# Patient Record
Sex: MALE | Race: WHITE | NOT HISPANIC OR LATINO | Employment: FULL TIME | URBAN - METROPOLITAN AREA
[De-identification: names, ages, dates, MRNs, and addresses within clinical notes are randomized per-mention and may not be internally consistent; named-entity substitution may affect disease eponyms.]

---

## 2017-09-01 ENCOUNTER — OFFICE VISIT (OUTPATIENT)
Dept: PODIATRY | Facility: CLINIC | Age: 37
End: 2017-09-01
Payer: COMMERCIAL

## 2017-09-01 VITALS
WEIGHT: 210 LBS | SYSTOLIC BLOOD PRESSURE: 156 MMHG | DIASTOLIC BLOOD PRESSURE: 92 MMHG | BODY MASS INDEX: 28.44 KG/M2 | HEIGHT: 72 IN

## 2017-09-01 DIAGNOSIS — L85.3 XEROSIS CUTIS: Primary | ICD-10-CM

## 2017-09-01 DIAGNOSIS — R23.4 FISSURE IN SKIN OF FOOT: ICD-10-CM

## 2017-09-01 DIAGNOSIS — L30.9 DERMATITIS OF FOOT: ICD-10-CM

## 2017-09-01 DIAGNOSIS — L84 CALLUS OF HEEL: ICD-10-CM

## 2017-09-01 PROCEDURE — 99203 OFFICE O/P NEW LOW 30 MIN: CPT | Mod: S$GLB,,, | Performed by: PODIATRIST

## 2017-09-01 PROCEDURE — 3008F BODY MASS INDEX DOCD: CPT | Mod: S$GLB,,, | Performed by: PODIATRIST

## 2017-09-01 PROCEDURE — 99999 PR PBB SHADOW E&M-NEW PATIENT-LVL II: CPT | Mod: PBBFAC,,, | Performed by: PODIATRIST

## 2017-09-01 RX ORDER — UREA 40 %
CREAM (GRAM) TOPICAL 2 TIMES DAILY
Qty: 85 G | Refills: 5 | Status: SHIPPED | OUTPATIENT
Start: 2017-09-01

## 2017-09-01 RX ORDER — DESOXIMETASONE 0.5 MG/G
CREAM TOPICAL 2 TIMES DAILY
COMMUNITY

## 2017-09-01 RX ORDER — METOPROLOL SUCCINATE 100 MG/1
100 TABLET, EXTENDED RELEASE ORAL DAILY
COMMUNITY

## 2017-09-01 NOTE — PROGRESS NOTES
Subjective:      Patient ID: Craig Navarro is a 37 y.o. male.    Chief Complaint: Foot Problem (heeels dry and cracked) and Heel Pain    Craig is a 37 y.o. male who presents to the clinic complaining of thick hard and cracked skin on heels of both feet. This has been present for months. Uses a pumice stone and skin filer but this does not help much. Skin cracks frequently and bleeds and hurts. Uses Joya Bees Wax for heels which helps a little. Craig is inquiring about treatment options that may help more than his current treatment. Has a history of dermatitis to multiple toes and tops of feet that comes and goes.       Review of Systems   Constitution: Negative for chills and fever.   Cardiovascular: Negative for chest pain, claudication and leg swelling.   Respiratory: Negative for cough and shortness of breath.    Skin: Positive for dry skin (fissures heel) and rash.   Musculoskeletal: Negative.    Gastrointestinal: Negative for nausea and vomiting.   Neurological: Negative for numbness and paresthesias.   Psychiatric/Behavioral: Negative for altered mental status.           Objective:      Physical Exam   Constitutional: He is oriented to person, place, and time. He appears well-developed and well-nourished.   HENT:   Head: Normocephalic.   Cardiovascular: Intact distal pulses.    Pulses:       Dorsalis pedis pulses are 2+ on the right side, and 2+ on the left side.        Posterior tibial pulses are 2+ on the right side, and 2+ on the left side.   CRT < 3 sec to tips of toes. No edema noted to b/l LE.      Pulmonary/Chest: No respiratory distress.   Musculoskeletal:   Gastrocnemius equinus noted to b/l ankles with decreased DF noted on exam. MMT 5/5 in DF/PF/Inv/Ev resistance with no reproduction of pain in any direction. Passive range of motion of ankle and pedal joints is painless. Adequate pedal joint ROM.      Neurological: He is alert and oriented to person, place, and time. He has normal strength. No  sensory deficit.   Light touch, proprioception, and sharp/dull sensation are all intact bilaterally.   Skin: Skin is warm, dry and intact. No erythema.   No open lesions, lacerations or wounds noted. Nails are normotrophic to R 1-5 and L 1-5. Interdigital spaces clean, dry and intact b/l. No erythema noted to b/l foot. Skin texture normal. Pedal hair normal. Skin temperature normal b/l foot.     Diffuse xerosis noted to plantar aspects of b/l foot/heels. Healed fissures noted to b/l heels R>L. No open wounds or bleeding or signs of infection noted.     Improving dermatitis noted to dorsal toes 2-3 b/l. No erythema or drainage.    Psychiatric: He has a normal mood and affect. His behavior is normal. Judgment and thought content normal.   Vitals reviewed.            Assessment:       Encounter Diagnoses   Name Primary?    Dermatitis of foot     Xerosis cutis Yes    Callus of heel - Left Foot     Callus of heel - Right Foot     Fissure in skin of foot - Right Foot          Plan:       Craig was seen today for foot problem and heel pain.    Diagnoses and all orders for this visit:    Xerosis cutis    Dermatitis of foot    Callus of heel - Left Foot    Callus of heel - Right Foot    Fissure in skin of foot - Right Foot    Other orders  -     urea (CARMOL) 40 % Crea; Apply topically 2 (two) times daily.      I counseled the patient on his conditions, their implications and medical management.    Discussed regular and routine moisturizer to skin of both feet to help improve dry skin. Advised to apply twice daily until resolution of symptoms. Avoid between toes. Rx Urea. Can be purchased on [email or web address]    Advised to avoid loose fitting shoes where his heel may be slipping constantly. This will increase friction and callus formation.     Use rotary electric callus filer carefully to remove thick hard skin further. This is to be done daily until callus is under control. He is to use moisturizer on the area twice daily  until improvement noted.     If Urea does not work, try Vicks vaporub on fissures.     Use Neosporin and bandaid on the area if fissures re-open and bleed.     RTC as needed if symptoms worsen or fail to improve.